# Patient Record
Sex: MALE | Race: WHITE | NOT HISPANIC OR LATINO | Employment: FULL TIME | URBAN - METROPOLITAN AREA
[De-identification: names, ages, dates, MRNs, and addresses within clinical notes are randomized per-mention and may not be internally consistent; named-entity substitution may affect disease eponyms.]

---

## 2017-06-30 ENCOUNTER — TRANSCRIBE ORDERS (OUTPATIENT)
Dept: ADMINISTRATIVE | Facility: HOSPITAL | Age: 22
End: 2017-06-30

## 2017-06-30 DIAGNOSIS — N50.82 PAIN IN SCROTUM: Primary | ICD-10-CM

## 2017-07-03 ENCOUNTER — HOSPITAL ENCOUNTER (OUTPATIENT)
Dept: ULTRASOUND IMAGING | Facility: HOSPITAL | Age: 22
Discharge: HOME | End: 2017-07-03
Payer: COMMERCIAL

## 2017-07-03 DIAGNOSIS — N50.82 PAIN IN SCROTUM: ICD-10-CM

## 2017-07-03 PROCEDURE — 76870 US EXAM SCROTUM: CPT

## 2021-12-27 ENCOUNTER — HOSPITAL ENCOUNTER (EMERGENCY)
Facility: HOSPITAL | Age: 26
Discharge: HOME | End: 2021-12-27
Attending: EMERGENCY MEDICINE
Payer: COMMERCIAL

## 2021-12-27 VITALS
RESPIRATION RATE: 20 BRPM | DIASTOLIC BLOOD PRESSURE: 86 MMHG | HEART RATE: 88 BPM | WEIGHT: 315 LBS | BODY MASS INDEX: 62.27 KG/M2 | OXYGEN SATURATION: 97 % | SYSTOLIC BLOOD PRESSURE: 179 MMHG | TEMPERATURE: 98.5 F

## 2021-12-27 DIAGNOSIS — R07.9 CHEST PAIN: Primary | ICD-10-CM

## 2021-12-27 DIAGNOSIS — K20.90 ESOPHAGITIS: ICD-10-CM

## 2021-12-27 PROCEDURE — 93005 ELECTROCARDIOGRAM TRACING: CPT

## 2021-12-27 PROCEDURE — 99284 EMERGENCY DEPT VISIT MOD MDM: CPT

## 2021-12-27 PROCEDURE — 99284 EMERGENCY DEPT VISIT MOD MDM: CPT | Performed by: EMERGENCY MEDICINE

## 2021-12-27 RX ORDER — FLUTICASONE PROPIONATE 220 UG/1
2 AEROSOL, METERED RESPIRATORY (INHALATION) 2 TIMES DAILY
COMMUNITY
Start: 2021-12-07

## 2021-12-27 NOTE — ED PROVIDER NOTES
History  Chief Complaint   Patient presents with    Chest Pain     States pain upper chest and left arm and shoulder since 12/24  States has feeling of heart skipping a beat " or it stops for a second "  States has SOB     Patient has a history of GERD and eosinophilic esophagitis  He states he has had some dietary indiscretions as well as been off of his medication during this holiday season  He has developed pain in center of his chest radiating to the left arm and shoulder for about 3 days  Patient states he can not feel an irregularity in his heartbeat which has made him quite concerned  States his anxiety level is higher  Patient has no cardiac history  He has an upcoming HIDA scan scheduled  Prior to Admission Medications   Prescriptions Last Dose Informant Patient Reported? Taking? OMEPRAZOLE PO Past Week at Unknown time  Yes Yes   Sig: Take 40 mg by mouth daily   fluticasone (FLOVENT HFA) 220 mcg/act inhaler Past Week at Unknown time  Yes Yes   Sig: Inhale 2 puffs 2 (two) times a day      Facility-Administered Medications: None       Past Medical History:   Diagnosis Date    Asthma     Dysphagia     Hypercholesteremia     Hypospadias     Scoliosis     Sleep apnea        History reviewed  No pertinent surgical history  History reviewed  No pertinent family history  I have reviewed and agree with the history as documented  E-Cigarette/Vaping    E-Cigarette Use Never User      E-Cigarette/Vaping Substances     Social History     Tobacco Use    Smoking status: Never Smoker    Smokeless tobacco: Never Used   Vaping Use    Vaping Use: Never used   Substance Use Topics    Alcohol use: Yes     Comment: occasionally    Drug use: Never       Review of Systems   Constitutional: Negative for chills and fever  HENT: Negative for congestion, sore throat and trouble swallowing  Respiratory: Negative for cough, shortness of breath, wheezing and stridor      Cardiovascular: Positive for chest pain and palpitations  Negative for leg swelling  Gastrointestinal: Negative for abdominal pain and vomiting  Genitourinary: Negative for dysuria  Musculoskeletal: Positive for arthralgias  Negative for back pain  Skin: Negative for rash  Neurological: Negative for dizziness, weakness and light-headedness  Hematological: Does not bruise/bleed easily  Psychiatric/Behavioral: Negative for confusion  The patient is nervous/anxious  All other systems reviewed and are negative  Physical Exam  Physical Exam  Vitals and nursing note reviewed  Constitutional:       General: He is not in acute distress  Appearance: He is obese  He is not ill-appearing  HENT:      Head: Normocephalic  Eyes:      Pupils: Pupils are equal, round, and reactive to light  Cardiovascular:      Rate and Rhythm: Normal rate and regular rhythm  Heart sounds: Normal heart sounds  Pulmonary:      Effort: Pulmonary effort is normal       Breath sounds: Normal breath sounds  Chest:      Chest wall: No tenderness  Abdominal:      Palpations: Abdomen is soft  Tenderness: There is no abdominal tenderness  Musculoskeletal:         General: Normal range of motion  Cervical back: Normal range of motion  Right lower leg: No tenderness  Left lower leg: No tenderness  Skin:     General: Skin is warm and dry  Capillary Refill: Capillary refill takes less than 2 seconds  Neurological:      General: No focal deficit present  Mental Status: He is alert and oriented to person, place, and time     Psychiatric:         Mood and Affect: Mood normal          Behavior: Behavior normal          Vital Signs  ED Triage Vitals [12/27/21 1327]   Temperature Pulse Respirations Blood Pressure SpO2   99 4 °F (37 4 °C) 87 20 (!) 182/103 98 %      Temp Source Heart Rate Source Patient Position - Orthostatic VS BP Location FiO2 (%)   Tympanic Monitor Sitting Left arm --      Pain Score       8 Vitals:    12/27/21 1327 12/27/21 1441   BP: (!) 182/103 (!) 179/86   Pulse: 87 88   Patient Position - Orthostatic VS: Sitting Sitting         Visual Acuity      ED Medications  Medications - No data to display    Diagnostic Studies  Results Reviewed     None                 No orders to display              Procedures  ECG 12 Lead Documentation Only    Date/Time: 12/27/2021 1:42 PM  Performed by: Pato Patricia MD  Authorized by: Pato Patricia MD     Indications / Diagnosis:  Chest pain  ECG reviewed by me, the ED Provider: yes    Patient location:  ED  Interpretation:     Interpretation: normal    Rate:     ECG rate:  84    ECG rate assessment: normal    Rhythm:     Rhythm: sinus rhythm    Ectopy:     Ectopy: none    QRS:     QRS axis:  Normal    QRS intervals:  Normal  Conduction:     Conduction: normal    ST segments:     ST segments:  Normal  T waves:     T waves: normal               ED Course                                             MDM  Number of Diagnoses or Management Options  Chest pain  Esophagitis  Diagnosis management comments: Patient has a sinus arrhythmia which he can feel  He has a family history but no other cardiac risk factors  He has been off of his esophagitis medication  EKG is normal   Patient is triple vaccinated he will monitor his blood pressure and EKG as he has a new machine coming by mail this week  resume H2 and PPI      Disposition  Final diagnoses:   Chest pain   Esophagitis     Time reflects when diagnosis was documented in both MDM as applicable and the Disposition within this note     Time User Action Codes Description Comment    12/27/2021  3:17 PM Navajo Savant Add [R07 9] Chest pain     12/27/2021  3:17 PM Lakhwinder Savant Add [K20 90] Esophagitis       ED Disposition     ED Disposition Condition Date/Time Comment    Discharge Stable Mon Dec 27, 2021  3:17 PM [address] [place] [address] Floor discharge to home/self care              Follow-up Information     Follow up With Specialties Details Why Contact Info    Jhon Driscoll MD Family Medicine Schedule an appointment as soon as possible for a visit   [address]  [phone]            Patient's Medications   Discharge Prescriptions    No medications on file       No discharge procedures on file      PDMP Review     None          ED Provider  Electronically Signed by           Whitney Shipley MD  12/27/21 4322

## 2021-12-28 LAB
ATRIAL RATE: 84 BPM
P AXIS: 34 DEGREES
PR INTERVAL: 154 MS
QRS AXIS: 11 DEGREES
QRSD INTERVAL: 106 MS
QT INTERVAL: 366 MS
QTC INTERVAL: 432 MS
T WAVE AXIS: 17 DEGREES
VENTRICULAR RATE: 84 BPM

## 2021-12-28 PROCEDURE — 93010 ELECTROCARDIOGRAM REPORT: CPT | Performed by: INTERNAL MEDICINE
